# Patient Record
Sex: FEMALE | URBAN - METROPOLITAN AREA
[De-identification: names, ages, dates, MRNs, and addresses within clinical notes are randomized per-mention and may not be internally consistent; named-entity substitution may affect disease eponyms.]

---

## 2022-09-29 ENCOUNTER — WEB ENCOUNTER (OUTPATIENT)
Dept: URBAN - METROPOLITAN AREA CLINIC 107 | Facility: CLINIC | Age: 62
End: 2022-09-29

## 2022-10-04 ENCOUNTER — TELEPHONE ENCOUNTER (OUTPATIENT)
Dept: URBAN - METROPOLITAN AREA CLINIC 113 | Facility: CLINIC | Age: 62
End: 2022-10-04

## 2022-10-04 ENCOUNTER — OFFICE VISIT (OUTPATIENT)
Dept: URBAN - METROPOLITAN AREA CLINIC 107 | Facility: CLINIC | Age: 62
End: 2022-10-04
Payer: MEDICARE

## 2022-10-04 VITALS
SYSTOLIC BLOOD PRESSURE: 117 MMHG | HEART RATE: 70 BPM | HEIGHT: 63 IN | DIASTOLIC BLOOD PRESSURE: 88 MMHG | WEIGHT: 236.6 LBS | TEMPERATURE: 98.1 F | BODY MASS INDEX: 41.92 KG/M2

## 2022-10-04 DIAGNOSIS — R93.3 ABNORMAL CT SCAN, GASTROINTESTINAL TRACT: ICD-10-CM

## 2022-10-04 DIAGNOSIS — D17.1 LIPOMA OF ABDOMINAL WALL: ICD-10-CM

## 2022-10-04 PROCEDURE — 99203 OFFICE O/P NEW LOW 30 MIN: CPT | Performed by: STUDENT IN AN ORGANIZED HEALTH CARE EDUCATION/TRAINING PROGRAM

## 2022-10-04 RX ORDER — LOSARTAN POTASSIUM 25 MG/1
1 TABLET TABLET ORAL ONCE A DAY
Status: ACTIVE | COMMUNITY

## 2022-10-04 RX ORDER — LEVOTHYROXINE SODIUM 112 UG/1
1 TABLET IN THE MORNING ON AN EMPTY STOMACH TABLET ORAL ONCE A DAY
Status: ACTIVE | COMMUNITY

## 2022-10-04 RX ORDER — SODIUM SULFATE, MAGNESIUM SULFATE, AND POTASSIUM CHLORIDE 17.75; 2.7; 2.25 G/1; G/1; G/1
12 TABLETS TABLET ORAL
Qty: 24 TABLETS | Refills: 0 | OUTPATIENT
Start: 2022-10-04 | End: 2022-10-05
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
## 2022-10-04 NOTE — PHYSICAL EXAM GASTROINTESTINAL
Abdomen , soft, nontender, nondistended , no guarding or rigidity , lumpy/bumpy and mobile area felt within subcutaneous region of RUQ , normal bowel sounds , Liver and Spleen,  no hepatosplenomegaly , liver nontender

## 2022-10-26 ENCOUNTER — TELEPHONE ENCOUNTER (OUTPATIENT)
Dept: URBAN - METROPOLITAN AREA CLINIC 113 | Facility: CLINIC | Age: 62
End: 2022-10-26

## 2022-11-09 ENCOUNTER — OFFICE VISIT (OUTPATIENT)
Dept: URBAN - METROPOLITAN AREA MEDICAL CENTER 37 | Facility: MEDICAL CENTER | Age: 62
End: 2022-11-09

## 2022-12-05 ENCOUNTER — OFFICE VISIT (OUTPATIENT)
Dept: URBAN - METROPOLITAN AREA CLINIC 107 | Facility: CLINIC | Age: 62
End: 2022-12-05

## 2023-10-16 PROBLEM — Z00.00 ENCOUNTER FOR PREVENTIVE HEALTH EXAMINATION: Status: ACTIVE | Noted: 2023-10-16

## 2023-11-01 ENCOUNTER — DASHBOARD ENCOUNTERS (OUTPATIENT)
Age: 63
End: 2023-11-01

## 2023-11-01 ENCOUNTER — LAB OUTSIDE AN ENCOUNTER (OUTPATIENT)
Dept: URBAN - METROPOLITAN AREA CLINIC 72 | Facility: CLINIC | Age: 63
End: 2023-11-01

## 2023-11-01 ENCOUNTER — OFFICE VISIT (OUTPATIENT)
Dept: URBAN - METROPOLITAN AREA CLINIC 72 | Facility: CLINIC | Age: 63
End: 2023-11-01
Payer: MEDICARE

## 2023-11-01 VITALS
DIASTOLIC BLOOD PRESSURE: 81 MMHG | SYSTOLIC BLOOD PRESSURE: 120 MMHG | BODY MASS INDEX: 43.02 KG/M2 | WEIGHT: 242.8 LBS | HEIGHT: 63 IN | HEART RATE: 80 BPM | TEMPERATURE: 97.3 F

## 2023-11-01 DIAGNOSIS — R93.3 ABNORMAL CT SCAN, GASTROINTESTINAL TRACT: ICD-10-CM

## 2023-11-01 DIAGNOSIS — Z12.11 COLON CANCER SCREENING: ICD-10-CM

## 2023-11-01 PROCEDURE — 99213 OFFICE O/P EST LOW 20 MIN: CPT | Performed by: INTERNAL MEDICINE

## 2023-11-01 RX ORDER — LOSARTAN POTASSIUM 25 MG/1
1 TABLET TABLET ORAL ONCE A DAY
Status: ACTIVE | COMMUNITY

## 2023-11-01 RX ORDER — LEVOTHYROXINE SODIUM 112 UG/1
1 TABLET IN THE MORNING ON AN EMPTY STOMACH TABLET ORAL ONCE A DAY
Status: ACTIVE | COMMUNITY

## 2023-11-01 RX ORDER — SOD SULF/POT CHLORIDE/MAG SULF 1.479 G
12 TABLETS TABLET ORAL
Qty: 24 | Refills: 0 | OUTPATIENT
Start: 2023-11-01 | End: 2023-11-02
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
## 2023-11-01 NOTE — EXAM-GENERAL EXAMINATION
General--no acute distress, resting comfortably Eyes--anicteric, no pallor HENT--normocephalic, atraumatic head Neck--no lymphadenopathy, non tender Chest--non labored breathing, equal rise Abdomen--soft, non tender, non distended, no organomegaly Ext: ESTEVAN, no obvious sores or rashes Psych: appropriate mood and affect Neuro--alert and oriented, answers appropriately

## 2023-12-18 ENCOUNTER — APPOINTMENT (OUTPATIENT)
Dept: NEUROLOGY | Facility: CLINIC | Age: 63
End: 2023-12-18
Payer: MEDICARE

## 2023-12-18 VITALS
SYSTOLIC BLOOD PRESSURE: 114 MMHG | WEIGHT: 235 LBS | HEIGHT: 63 IN | DIASTOLIC BLOOD PRESSURE: 80 MMHG | OXYGEN SATURATION: 95 % | HEART RATE: 94 BPM | BODY MASS INDEX: 41.64 KG/M2

## 2023-12-18 DIAGNOSIS — E53.8 DEFICIENCY OF OTHER SPECIFIED B GROUP VITAMINS: ICD-10-CM

## 2023-12-18 DIAGNOSIS — R20.0 ANESTHESIA OF SKIN: ICD-10-CM

## 2023-12-18 DIAGNOSIS — R20.2 ANESTHESIA OF SKIN: ICD-10-CM

## 2023-12-18 DIAGNOSIS — Z86.39 PERSONAL HISTORY OF OTHER ENDOCRINE, NUTRITIONAL AND METABOLIC DISEASE: ICD-10-CM

## 2023-12-18 DIAGNOSIS — Z86.69 PERSONAL HISTORY OF OTHER DISEASES OF THE NERVOUS SYSTEM AND SENSE ORGANS: ICD-10-CM

## 2023-12-18 DIAGNOSIS — Z02.82 ENCOUNTER FOR ADOPTION SERVICES: ICD-10-CM

## 2023-12-18 DIAGNOSIS — Z86.79 PERSONAL HISTORY OF OTHER DISEASES OF THE CIRCULATORY SYSTEM: ICD-10-CM

## 2023-12-18 DIAGNOSIS — Z78.9 OTHER SPECIFIED HEALTH STATUS: ICD-10-CM

## 2023-12-18 PROCEDURE — 99204 OFFICE O/P NEW MOD 45 MIN: CPT

## 2023-12-18 RX ORDER — ASCORBIC ACID 125 MG
TABLET,CHEWABLE ORAL
Refills: 0 | Status: ACTIVE | COMMUNITY

## 2023-12-18 RX ORDER — CYANOCOBALAMIN, ISOPROPYL ALCOHOL 1000MCG/ML
KIT INJECTION
Refills: 0 | Status: ACTIVE | COMMUNITY

## 2023-12-18 RX ORDER — NAPROXEN SODIUM 220 MG
TABLET ORAL
Refills: 0 | Status: ACTIVE | COMMUNITY

## 2023-12-18 RX ORDER — DULOXETINE HYDROCHLORIDE 30 MG/1
30 CAPSULE, DELAYED RELEASE PELLETS ORAL
Qty: 60 | Refills: 3 | Status: ACTIVE | COMMUNITY
Start: 2023-12-18 | End: 1900-01-01

## 2023-12-18 RX ORDER — HYDROCHLOROTHIAZIDE 12.5 MG/1
12.5 CAPSULE ORAL
Refills: 0 | Status: ACTIVE | COMMUNITY

## 2023-12-18 RX ORDER — LOSARTAN POTASSIUM 25 MG/1
25 TABLET, FILM COATED ORAL
Refills: 0 | Status: ACTIVE | COMMUNITY

## 2023-12-18 RX ORDER — LEVOTHYROXINE SODIUM 0.11 MG/1
112 TABLET ORAL
Refills: 0 | Status: ACTIVE | COMMUNITY

## 2023-12-18 NOTE — REVIEW OF SYSTEMS
[Leg Weakness] : leg weakness [Numbness] : numbness [Tingling] : tingling [Abnormal Sensation] : an abnormal sensation [Depression] : depression [Arthralgias] : arthralgias [Joint Pain] : joint pain [Negative] : Heme/Lymph [Fever] : no fever [Chills] : no chills [Feeling Tired] : not feeling tired [Confused or Disoriented] : no confusion [Memory Lapses or Loss] : no memory loss [Decr. Concentrating Ability] : no decrease in concentrating ability [Difficulty with Language] : no ~M difficulty with language [Facial Weakness] : no facial weakness [Arm Weakness] : no arm weakness [Hand Weakness] : no hand weakness [Seizures] : no convulsions [Dizziness] : no dizziness [Fainting] : no fainting [Lightheadedness] : no lightheadedness [Vertigo] : no vertigo [Migraine Headache] : no migraine headache [Difficulty Walking] : no difficulty walking [Suicidal] : not suicidal [Sleep Disturbances] : no sleep disturbances [Anxiety] : no anxiety [Eye Pain] : no eye pain [Red Eyes] : eyes not red [Earache] : no earache [Loss Of Hearing] : no hearing loss [Shortness Of Breath] : no shortness of breath [SOB on Exertion] : no shortness of breath during exertion [Abdominal Pain] : no abdominal pain [Vomiting] : no vomiting [Constipation] : no constipation [Diarrhea] : no diarrhea

## 2023-12-18 NOTE — DISCUSSION/SUMMARY
[FreeTextEntry1] : 63-year-old female presents to clinic with 3-year history of numbness tingling in the bilateral upper and lower extremities with weakness in the right lower extremity.  MRIs demonstrate significant degenerative changes at multiple levels.  This is concerning for for radiculopathy and weakness.  My plan is as follows:  1 request EMG/NCS bilateral upper and lower extremities  2. I have requested the following blood work-up for neuropathy; ANCA Reflex MPO and PROT3, Angiotensin Converting Enzyme, Serum, Anti DNAse B Titer, Anti-Nuclear Antibody Ceruloplasmin, serum, C-Reactive Protein, Serum, Heavy Metal Screen, Hepatitis C Ab w/reflex to qualitative HCVRNA, Homocysteine, Serum, Protein Electrophoresis, Serum Protein Electrophoresis, Urine, Rheumatoid Factor Quant, Serum or Plasma, Sedimentation Rate, Erythrocyte, Thyroid Stimulating Hormone, Serum w/ FT4 Reflex, Vitamin B1, Whole Blood, Vitamin B12, Serum and, Vitamin B6.  3.  Referral to physical therapy for degenerative changes with cervical thoracic and lumbar spines with related numbness tingling and weakness.  4.  Start patient on Cymbalta 30 mg every 24 hours for paresthesia of the upper and lower extremities and for possible depression related to chronic pain.  In 10 days, patient can increase to 60 mg every 24 hours if not palliative and tolerating side effects.  5.  Return to clinic in 6 weeks for follow-up evaluation and this can be via telehealth as well.

## 2023-12-18 NOTE — PHYSICAL EXAM
[General Appearance - Alert] : alert [General Appearance - In No Acute Distress] : in no acute distress [Oriented To Time, Place, And Person] : oriented to person, place, and time [Affect] : the affect was normal [Mood] : the mood was normal [Cranial Nerves Optic (II)] : visual acuity intact bilaterally,  visual fields full to confrontation, pupils equal round and reactive to light [Cranial Nerves Oculomotor (III)] : extraocular motion intact [Cranial Nerves Trigeminal (V)] : facial sensation intact symmetrically [Cranial Nerves Facial (VII)] : face symmetrical [Cranial Nerves Vestibulocochlear (VIII)] : hearing was intact bilaterally [Cranial Nerves Glossopharyngeal (IX)] : tongue and palate midline [Cranial Nerves Accessory (XI - Cranial And Spinal)] : head turning and shoulder shrug symmetric [Cranial Nerves Hypoglossal (XII)] : there was no tongue deviation with protrusion [Motor Strength] : muscle strength was normal in all four extremities [Involuntary Movements] : no involuntary movements were seen [No Muscle Atrophy] : normal bulk in all four extremities [Motor Handedness Right-Handed] : the patient is right hand dominant [Sensation Vibration Decrease] : vibration was intact [Sensation Tactile Decrease] : light touch was intact [Proprioception] : proprioception was intact [1+] : Ankle jerk left 1+ [PERRL With Normal Accommodation] : pupils were equal in size, round, reactive to light, with normal accommodation [Extraocular Movements] : extraocular movements were intact [Outer Ear] : the ears and nose were normal in appearance [Hearing Threshold Finger Rub Not Cobb] : hearing was normal [Neck Appearance] : the appearance of the neck was normal [Neck Cervical Mass (___cm)] : no neck mass was observed [Exaggerated Use Of Accessory Muscles For Inspiration] : no accessory muscle use [Auscultation Breath Sounds / Voice Sounds] : lungs were clear to auscultation bilaterally [Apical Impulse] : the apical impulse was normal [Heart Sounds] : normal S1 and S2 [Murmurs] : no murmurs [Arterial Pulses Carotid] : carotid pulses were normal with no bruits [Abdomen Soft] : soft [Abdomen Tenderness] : non-tender [Nail Clubbing] : no clubbing  or cyanosis of the fingernails [Motor Tone] : muscle strength and tone were normal [Apractic] : apractic [Ataxic] : ataxic [] : no rash [Skin Lesions] : no skin lesions [FreeTextEntry1] : BMI 41.63 [Paresis Pronator Drift Right-Sided] : no pronator drift on the right [Paresis Pronator Drift Left-Sided] : no pronator drift on the left [Motor Strength Upper Extremities Bilaterally] : strength was normal in both upper extremities [Motor Strength Lower Extremities Bilaterally] : strength was normal in both lower extremities [Romberg's Sign] : Romberg's sign was negtive [Tremor] : no tremor present [Coordination - Dysmetria Impaired Finger-to-Nose Bilateral] : not present

## 2023-12-18 NOTE — DATA REVIEWED
[de-identified] :   MRI brain with IV contrast 12/11/2023: Transsphenoidal postsurgical changes of the sella with draping of the optic chiasm and partially empty sella.  Unchanged enhancing soft tissue along the left cavernous sinus may represent residual neoplasm.  The normal flow voids of the internal carotid arteries are present.  No mass effect upon the optic nerves or optic chiasm is noted.  No acute hemorrhage or infarct.  Age-appropriate able involutional and mild microvascular ischemic change.  A left parietal developmental venous anomaly with associated chronic hemorrhage, unchanged compared with the prior study.  No hydrocephalus midline shift or extra-axial collections.  Flow voids at the skull base are unremarkable.  There is no extra-axial collection or extra-axial mass.  The orbits are not remarkable in appearance.  The visualized paranasal sinuses mastoid cavities are free of acute disease.  Multiple sebaceous cysts are noted within the scalp.  Impression unchanged appearance of the sella with small amount of enhancing soft tissue adjacent to/within the left cavernous sinus likely representing residual tumor.  Age-appropriate involutional and mild microvascular ischemic change.  Left parietal the omental venous anomaly with adjacent chronic hemorrhage.  MRI LS spine noncontrast 3/24/2021: 5 lumbar vertebral bodies are assumed.  Coronal localizer images demonstrate no significant scoliosis.  The normal lumbar lordosis is straightened.  Mild grade 1 anterior listhesis of L4 and L5 is noted probable hemangioma is noted within the T12 vertebral body, left L1 pedicle, L3 vertebral body and S1 vertebral body.  No aggressive appearing osseous lesion is appreciated.  The vertebral body heights are intact.  The conus terminates at T2-L1.  There is multilevel discogenic degenerative disease with disc desiccation and intervertebral disc height narrowing.  The findings at the individual levels are as follows: T12-L1 only evaluated in the sagittal plane.  Circumferential disc bulge indents the ventral thecal sac.  L1-2 there is circumferential disc bulge symmetric to the right resulting in flattening of the vertebral thecal sac and minimal right neuroforaminal narrowing.  L2-3 broad-based proximal left foraminal disc protrusion resulting in left neuroforaminal narrowing and contact of the existing left L2 nerve root.  L3-4 there is circumferential disc bulge and a superimposed central disc protrusion and tiny annular tear.  The ventral thecal sac is indented.  In conjunction with ligamentum flavum unfolding there is mild central canal narrowing.  Mild bilateral neuroforaminal stenosis is also noted left greater than right.  L4-5 severe bilateral facet arthrosis is noted right greater than left.  Uncovering of the disc and a circumferential disc bulge contribute to moderate right greater than left neuroforaminal stenosis and exiting L4 nerve root impingement.  The ventral thecal sac is flattened.  L5-S1 there is circumferential disc bulge with superimposed central/right paracentral disc protrusion with indenting of the ventral thecal sac.  Discogenic contacts but does not displace the descending right S1 nerve root.  Mild bilateral neuroforaminal narrowing is noted.  There is mild bilateral facet arthrosis.  The posterior Spinal muscles are symmetric.  Impression straightening with mild L4-5 spondylolisthesis.  Spondylosis and facet arthrosis most notably at L4-5 with exiting L4 nerve root impingement.  Additional milder filings at the levels as above.  MRI thoracic spine noncontrast 3/24/2021: 12 thoracic vertebral bodies are assumed.  Coronal localizer images demonstrate no significant scoliosis.  The thoracic spine is mildly kyphotic mild grade 1 anterior listhesis of T2 on T3 is noted.  Probable atypical vertebral body hemangioma suggested within the posterior aspect of T12 vertebral body.  No aggressive appearing osseous lesion is present.  The vertebral body heights are intact.  There is mild disc desiccation and intervertebral disc height narrowing.  A shallow broad-based central disc protrusion at T1-2 and mildly flattening the ventral thecal sac.  There is a broad-based right paracentral disc protrusion with mild indentation of the ventral thecal sac and flattening of the ventral cord.  No cord edema is present no severe central canal or neuroforaminal stenosis is appreciated.  The thoracic cord demonstrates normal signal intensity.  There is no evidence for cord edema.  The conus medullaris is at T12-L1.  The posterior paraspinal muscles are symmetric.  Impression mild upper thoracic spondylosis with no cord edema.  MRI cervical spine noncontrast 11/24/2020.  Posterior fossa and brainstem unremarkable.  The cerebellar tonsils terminate in the normal position without a Chiari malformation.  Normal spinal cord signal characteristics without edema or myelomalacia from the cervical medullary junction to the visualized upper thoracic cord.  Vertebral bodies are normal in height without fractures.  No vertebral bodies or thesis is present.  Normal bone marrow signal.  No focal osseous lesion is seen.  Paraspinal soft tissues are unremarkable.  The thyroid is unremarkable.  C2-3 there is bilateral neuroforaminal narrowing.  There is no spinal canal stenosis.  There is no disc herniation.  C3-4 there is minimal broad-based posterior disc osteophyte complex.  There is left neuroforaminal narrowing.  There is no spinal canal stenosis.  C4-5 there is a minimal broad-based posterior disc osteophyte complex.  There is bilateral neuroforaminal narrowing.  There is no spinal canal stenosis.  C5-6 there is a 3 mm broad-based posterior disc osteophyte complex there is spinal canal stenosis measuring 8 mm in the AP dimension there is bilateral neuroforaminal narrowing.  C6-7 there is a 3 mm broad-based posterior disc osteophyte complex.  There is no neuroforaminal narrowing.  There is no spinal canal stenosis.  C7-T1 no disc herniation, central canal or foraminal stenosis.
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
## 2023-12-20 ENCOUNTER — OFFICE (OUTPATIENT)
Dept: URBAN - METROPOLITAN AREA CLINIC 12 | Facility: CLINIC | Age: 63
Setting detail: OPHTHALMOLOGY
End: 2023-12-20
Payer: MEDICARE

## 2023-12-20 ENCOUNTER — TELEPHONE ENCOUNTER (OUTPATIENT)
Dept: URBAN - METROPOLITAN AREA CLINIC 113 | Facility: CLINIC | Age: 63
End: 2023-12-20

## 2023-12-20 DIAGNOSIS — H25.13: ICD-10-CM

## 2023-12-20 DIAGNOSIS — D35.2: ICD-10-CM
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
## 2023-12-20 DIAGNOSIS — H35.3131: ICD-10-CM

## 2023-12-20 PROCEDURE — 92083 EXTENDED VISUAL FIELD XM: CPT | Performed by: OPHTHALMOLOGY

## 2023-12-20 PROCEDURE — BRUDER EYE BRUDER EYE PADS: Performed by: OPHTHALMOLOGY

## 2023-12-20 PROCEDURE — 92250 FUNDUS PHOTOGRAPHY W/I&R: CPT | Performed by: OPHTHALMOLOGY

## 2023-12-20 PROCEDURE — 99204 OFFICE O/P NEW MOD 45 MIN: CPT | Performed by: OPHTHALMOLOGY

## 2023-12-20 ASSESSMENT — REFRACTION_AUTOREFRACTION
OS_CYLINDER: -1.00
OD_AXIS: 114
OD_SPHERE: -2.25
OD_CYLINDER: -0.75
OS_AXIS: 123
OS_SPHERE: -4.25

## 2023-12-20 ASSESSMENT — REFRACTION_MANIFEST
OS_SPHERE: -4.25
OD_CYLINDER: -0.75
OD_VA1: 20/40-1
OS_VA1: 20/50-2
OD_SPHERE: -2.25
OS_AXIS: 125
OS_CYLINDER: -1.00
OD_AXIS: 115

## 2023-12-20 ASSESSMENT — SPHEQUIV_DERIVED
OD_SPHEQUIV: -2.625
OS_SPHEQUIV: -4.75
OS_SPHEQUIV: -4.75
OD_SPHEQUIV: -2.625

## 2023-12-20 ASSESSMENT — CONFRONTATIONAL VISUAL FIELD TEST (CVF)
OD_FINDINGS: FULL
OS_FINDINGS: FULL

## 2023-12-21 ENCOUNTER — OFFICE (OUTPATIENT)
Dept: URBAN - METROPOLITAN AREA CLINIC 12 | Facility: CLINIC | Age: 63
Setting detail: OPHTHALMOLOGY
End: 2023-12-21
Payer: MEDICARE

## 2023-12-21 DIAGNOSIS — H25.13: ICD-10-CM

## 2023-12-21 DIAGNOSIS — H25.11: ICD-10-CM

## 2023-12-21 PROBLEM — H25.12 CATARACT; RIGHT EYE, LEFT EYE, BOTH EYES: Status: ACTIVE | Noted: 2023-12-21

## 2023-12-21 PROBLEM — H35.3131 AGE RELATED MACULAR DEGENERATION DRY; BOTH EYES EARLY: Status: ACTIVE | Noted: 2023-12-20

## 2023-12-21 PROBLEM — D35.2 PITUITARY TUMOR BENIGN: Status: ACTIVE | Noted: 2023-12-20

## 2023-12-21 PROCEDURE — 99213 OFFICE O/P EST LOW 20 MIN: CPT | Performed by: OPHTHALMOLOGY

## 2023-12-21 PROCEDURE — 92136 OPHTHALMIC BIOMETRY: CPT | Mod: TC | Performed by: OPHTHALMOLOGY

## 2023-12-21 PROCEDURE — 92136 OPHTHALMIC BIOMETRY: CPT | Mod: 26,RT | Performed by: OPHTHALMOLOGY

## 2023-12-21 ASSESSMENT — REFRACTION_MANIFEST
OD_CYLINDER: -0.75
OS_CYLINDER: -1.00
OD_VA1: 20/40-1
OD_AXIS: 115
OD_SPHERE: -2.25
OS_VA1: 20/50-2
OS_AXIS: 125
OS_SPHERE: -4.25

## 2023-12-21 ASSESSMENT — REFRACTION_AUTOREFRACTION
OD_CYLINDER: -0.75
OS_CYLINDER: -0.75
OD_SPHERE: -2.75
OS_SPHERE: -5.25
OD_AXIS: 108
OS_AXIS: 118

## 2023-12-21 ASSESSMENT — SPHEQUIV_DERIVED
OD_SPHEQUIV: -2.625
OS_SPHEQUIV: -4.75
OS_SPHEQUIV: -5.625
OD_SPHEQUIV: -3.125

## 2023-12-21 ASSESSMENT — CONFRONTATIONAL VISUAL FIELD TEST (CVF)
OD_FINDINGS: FULL
OS_FINDINGS: FULL

## 2024-01-02 ENCOUNTER — ASC (OUTPATIENT)
Dept: URBAN - METROPOLITAN AREA SURGERY 8 | Facility: SURGERY | Age: 64
Setting detail: OPHTHALMOLOGY
End: 2024-01-02
Payer: MEDICARE

## 2024-01-02 DIAGNOSIS — H25.11: ICD-10-CM

## 2024-01-02 DIAGNOSIS — H52.211: ICD-10-CM

## 2024-01-02 PROCEDURE — A9270 NON-COVERED ITEM OR SERVICE: HCPCS | Mod: GY | Performed by: OPHTHALMOLOGY

## 2024-01-02 PROCEDURE — 66984 XCAPSL CTRC RMVL W/O ECP: CPT | Mod: RT | Performed by: OPHTHALMOLOGY

## 2024-01-02 PROCEDURE — FEMTO PRECISION LASER CATARACT SURGERY: Mod: GY | Performed by: OPHTHALMOLOGY

## 2024-01-03 ENCOUNTER — RX ONLY (RX ONLY)
Age: 64
End: 2024-01-03

## 2024-01-03 ENCOUNTER — OFFICE (OUTPATIENT)
Dept: URBAN - METROPOLITAN AREA CLINIC 12 | Facility: CLINIC | Age: 64
Setting detail: OPHTHALMOLOGY
End: 2024-01-03
Payer: MEDICARE

## 2024-01-03 DIAGNOSIS — H25.12: ICD-10-CM

## 2024-01-03 PROCEDURE — 92136 OPHTHALMIC BIOMETRY: CPT | Mod: 26,LT | Performed by: OPHTHALMOLOGY

## 2024-01-03 ASSESSMENT — REFRACTION_AUTOREFRACTION
OD_CYLINDER: -0.25
OS_CYLINDER: -1.25
OD_AXIS: 167
OS_AXIS: 135
OD_SPHERE: -0.50
OS_SPHERE: -4.75

## 2024-01-03 ASSESSMENT — REFRACTION_MANIFEST
OS_CYLINDER: -1.00
OD_AXIS: 115
OD_CYLINDER: -0.75
OD_VA1: 20/40-1
OD_SPHERE: -2.25
OS_VA1: 20/50-2
OS_SPHERE: -4.25
OS_AXIS: 125

## 2024-01-03 ASSESSMENT — SPHEQUIV_DERIVED
OD_SPHEQUIV: -2.625
OD_SPHEQUIV: -0.625
OS_SPHEQUIV: -5.375
OS_SPHEQUIV: -4.75

## 2024-01-03 ASSESSMENT — CONFRONTATIONAL VISUAL FIELD TEST (CVF)
OS_FINDINGS: FULL
OD_FINDINGS: FULL

## 2024-01-09 ENCOUNTER — ASC (OUTPATIENT)
Dept: URBAN - METROPOLITAN AREA SURGERY 8 | Facility: SURGERY | Age: 64
Setting detail: OPHTHALMOLOGY
End: 2024-01-09
Payer: MEDICARE

## 2024-01-09 DIAGNOSIS — H52.212: ICD-10-CM

## 2024-01-09 DIAGNOSIS — H25.12: ICD-10-CM

## 2024-01-09 PROCEDURE — 66984 XCAPSL CTRC RMVL W/O ECP: CPT | Mod: 79,LT | Performed by: OPHTHALMOLOGY

## 2024-01-09 PROCEDURE — FEMTO PRECISION LASER CATARACT SURGERY: Mod: GY | Performed by: OPHTHALMOLOGY

## 2024-01-09 PROCEDURE — A9270 NON-COVERED ITEM OR SERVICE: HCPCS | Mod: GY | Performed by: OPHTHALMOLOGY

## 2024-01-10 ENCOUNTER — OFFICE (OUTPATIENT)
Dept: URBAN - METROPOLITAN AREA CLINIC 12 | Facility: CLINIC | Age: 64
Setting detail: OPHTHALMOLOGY
End: 2024-01-10
Payer: MEDICARE

## 2024-01-10 DIAGNOSIS — Z96.1: ICD-10-CM

## 2024-01-10 PROCEDURE — 99024 POSTOP FOLLOW-UP VISIT: CPT | Performed by: OPTOMETRIST

## 2024-01-10 ASSESSMENT — REFRACTION_AUTOREFRACTION
OS_AXIS: 012
OS_CYLINDER: -0.25
OD_SPHERE: PLANO
OS_SPHERE: +0.75
OD_CYLINDER: -0.25
OD_AXIS: 113

## 2024-01-10 ASSESSMENT — REFRACTION_MANIFEST
OD_CYLINDER: -0.75
OS_SPHERE: -4.25
OS_CYLINDER: -1.00
OS_AXIS: 125
OS_VA1: 20/50-2
OD_SPHERE: -2.25
OD_AXIS: 115
OD_VA1: 20/40-1

## 2024-01-10 ASSESSMENT — SPHEQUIV_DERIVED
OD_SPHEQUIV: -2.625
OS_SPHEQUIV: -4.75
OS_SPHEQUIV: 0.625

## 2024-01-10 ASSESSMENT — CONFRONTATIONAL VISUAL FIELD TEST (CVF)
OS_FINDINGS: FULL
OD_FINDINGS: FULL

## 2024-01-11 ENCOUNTER — OFFICE (OUTPATIENT)
Dept: URBAN - METROPOLITAN AREA CLINIC 12 | Facility: CLINIC | Age: 64
Setting detail: OPHTHALMOLOGY
End: 2024-01-11
Payer: MEDICARE

## 2024-01-11 DIAGNOSIS — Z96.1: ICD-10-CM

## 2024-01-11 PROCEDURE — 99024 POSTOP FOLLOW-UP VISIT: CPT | Performed by: OPTOMETRIST

## 2024-01-11 ASSESSMENT — REFRACTION_MANIFEST
OS_AXIS: 125
OD_VA1: 20/40-1
OS_SPHERE: -4.25
OD_CYLINDER: -0.75
OS_CYLINDER: -1.00
OD_SPHERE: -2.25
OS_VA1: 20/50-2
OD_AXIS: 115

## 2024-01-11 ASSESSMENT — SPHEQUIV_DERIVED
OS_SPHEQUIV: -4.75
OD_SPHEQUIV: -2.625
OS_SPHEQUIV: 0.25

## 2024-01-11 ASSESSMENT — REFRACTION_AUTOREFRACTION
OS_CYLINDER: -1.00
OD_CYLINDER: -0.50
OD_AXIS: 138
OD_SPHERE: PLANO
OS_AXIS: 173
OS_SPHERE: +0.75

## 2024-01-11 ASSESSMENT — CONFRONTATIONAL VISUAL FIELD TEST (CVF)
OS_FINDINGS: FULL
OD_FINDINGS: FULL

## 2024-01-15 ENCOUNTER — OFFICE (OUTPATIENT)
Dept: URBAN - METROPOLITAN AREA CLINIC 12 | Facility: CLINIC | Age: 64
Setting detail: OPHTHALMOLOGY
End: 2024-01-15
Payer: MEDICARE

## 2024-01-15 DIAGNOSIS — Z96.1: ICD-10-CM

## 2024-01-15 PROCEDURE — 99024 POSTOP FOLLOW-UP VISIT: CPT | Performed by: OPTOMETRIST

## 2024-01-15 ASSESSMENT — CONFRONTATIONAL VISUAL FIELD TEST (CVF)
OD_FINDINGS: FULL
OS_FINDINGS: FULL

## 2024-01-16 ASSESSMENT — SPHEQUIV_DERIVED
OS_SPHEQUIV: 0.125
OD_SPHEQUIV: -2.625
OS_SPHEQUIV: -4.75
OD_SPHEQUIV: 0

## 2024-01-16 ASSESSMENT — REFRACTION_AUTOREFRACTION
OS_SPHERE: +0.50
OD_CYLINDER: -0.50
OS_CYLINDER: -0.75
OD_SPHERE: +0.25
OD_AXIS: 133
OS_AXIS: 172

## 2024-01-16 ASSESSMENT — REFRACTION_MANIFEST
OS_SPHERE: -4.25
OD_SPHERE: -2.25
OD_AXIS: 115
OS_AXIS: 125
OS_VA1: 20/50-2
OD_VA1: 20/40-1
OD_CYLINDER: -0.75
OS_CYLINDER: -1.00

## 2024-01-25 ENCOUNTER — OFFICE VISIT (OUTPATIENT)
Dept: URBAN - METROPOLITAN AREA MEDICAL CENTER 40 | Facility: MEDICAL CENTER | Age: 64
End: 2024-01-25

## 2024-01-29 ENCOUNTER — APPOINTMENT (OUTPATIENT)
Dept: NEUROLOGY | Facility: CLINIC | Age: 64
End: 2024-01-29

## 2024-01-31 ENCOUNTER — OFFICE (OUTPATIENT)
Dept: URBAN - METROPOLITAN AREA CLINIC 12 | Facility: CLINIC | Age: 64
Setting detail: OPHTHALMOLOGY
End: 2024-01-31
Payer: MEDICARE

## 2024-01-31 DIAGNOSIS — Z96.1: ICD-10-CM

## 2024-01-31 PROBLEM — H52.7 REFRACTIVE ERROR ; BOTH EYES: Status: ACTIVE | Noted: 2024-01-31

## 2024-01-31 PROCEDURE — 99024 POSTOP FOLLOW-UP VISIT: CPT | Performed by: OPTOMETRIST

## 2024-01-31 ASSESSMENT — REFRACTION_MANIFEST
OD_ADD: +2.25
OS_VA1: 20/20
OS_CYLINDER: -0.25
OD_VA1: 20/20
OS_ADD: +2.25
OS_SPHERE: -4.25
OD_AXIS: 115
OS_VA1: 20/50-2
OD_CYLINDER: SPHERE
OS_SPHERE: +0.25
OS_AXIS: 125
OD_SPHERE: -2.25
OD_SPHERE: PLANO
OS_CYLINDER: -1.00
OD_VA1: 20/40-1
OD_CYLINDER: -0.75
OS_AXIS: 150

## 2024-01-31 ASSESSMENT — CONFRONTATIONAL VISUAL FIELD TEST (CVF)
OS_FINDINGS: FULL
OD_FINDINGS: FULL

## 2024-01-31 ASSESSMENT — SPHEQUIV_DERIVED
OD_SPHEQUIV: -2.625
OS_SPHEQUIV: -4.75
OS_SPHEQUIV: 0.125

## 2024-02-08 ENCOUNTER — OV EP (OUTPATIENT)
Dept: URBAN - METROPOLITAN AREA CLINIC 72 | Facility: CLINIC | Age: 64
End: 2024-02-08

## 2024-03-22 ENCOUNTER — COLON (OUTPATIENT)
Dept: URBAN - METROPOLITAN AREA MEDICAL CENTER 40 | Facility: MEDICAL CENTER | Age: 64
End: 2024-03-22

## 2024-03-22 RX ORDER — LEVOTHYROXINE SODIUM 112 UG/1
1 TABLET IN THE MORNING ON AN EMPTY STOMACH TABLET ORAL ONCE A DAY
Status: ACTIVE | COMMUNITY

## 2024-03-22 RX ORDER — LOSARTAN POTASSIUM 25 MG/1
1 TABLET TABLET ORAL ONCE A DAY
Status: ACTIVE | COMMUNITY

## 2024-12-30 ENCOUNTER — OFFICE (OUTPATIENT)
Dept: URBAN - METROPOLITAN AREA CLINIC 12 | Facility: CLINIC | Age: 64
Setting detail: OPHTHALMOLOGY
End: 2024-12-30
Payer: MEDICARE

## 2024-12-30 DIAGNOSIS — D35.2: ICD-10-CM

## 2024-12-30 DIAGNOSIS — H26.493: ICD-10-CM

## 2024-12-30 DIAGNOSIS — Z96.1: ICD-10-CM

## 2024-12-30 DIAGNOSIS — H52.7: ICD-10-CM

## 2024-12-30 DIAGNOSIS — H35.3131: ICD-10-CM

## 2024-12-30 PROCEDURE — 92250 FUNDUS PHOTOGRAPHY W/I&R: CPT | Performed by: STUDENT IN AN ORGANIZED HEALTH CARE EDUCATION/TRAINING PROGRAM

## 2024-12-30 PROCEDURE — 92014 COMPRE OPH EXAM EST PT 1/>: CPT | Performed by: STUDENT IN AN ORGANIZED HEALTH CARE EDUCATION/TRAINING PROGRAM

## 2024-12-30 ASSESSMENT — REFRACTION_MANIFEST
OD_ADD: +2.25
OS_CYLINDER: -0.25
OD_VA1: 20/40-1
OS_AXIS: 150
OS_VA1: 20/50-2
OS_ADD: +2.25
OS_CYLINDER: -1.00
OD_SPHERE: -2.25
OS_VA1: 20/20
OS_SPHERE: -4.25
OD_CYLINDER: -0.75
OD_SPHERE: PLANO
OS_AXIS: 125
OS_SPHERE: +0.25
OD_CYLINDER: SPHERE
OD_VA1: 20/20
OD_AXIS: 115

## 2024-12-30 ASSESSMENT — VISUAL ACUITY
OS_BCVA: 20/30
OD_BCVA: 20/30

## 2024-12-30 ASSESSMENT — REFRACTION_AUTOREFRACTION
OD_CYLINDER: SPHERE
OS_CYLINDER: -0.25
OS_AXIS: 018
OD_SPHERE: PLANO
OD_AXIS: 000
OS_SPHERE: +0.50

## 2024-12-30 ASSESSMENT — KERATOMETRY
OD_K1POWER_DIOPTERS: 43.75
OS_AXISANGLE_DEGREES: 110
OD_AXISANGLE_DEGREES: 089
OS_K1POWER_DIOPTERS: 43.00
OS_K2POWER_DIOPTERS: 44.25
OD_K2POWER_DIOPTERS: 44.25

## 2024-12-30 ASSESSMENT — CONFRONTATIONAL VISUAL FIELD TEST (CVF)
OS_FINDINGS: FULL
OD_FINDINGS: FULL

## 2024-12-30 ASSESSMENT — TONOMETRY
OD_IOP_MMHG: 19
OS_IOP_MMHG: 19

## 2025-05-01 ENCOUNTER — RX ONLY (RX ONLY)
Age: 65
End: 2025-05-01

## 2025-05-01 ENCOUNTER — OFFICE (OUTPATIENT)
Dept: URBAN - METROPOLITAN AREA CLINIC 12 | Facility: CLINIC | Age: 65
Setting detail: OPHTHALMOLOGY
End: 2025-05-01
Payer: MEDICARE

## 2025-05-01 DIAGNOSIS — H26.492: ICD-10-CM

## 2025-05-01 PROCEDURE — 66821 AFTER CATARACT LASER SURGERY: CPT | Mod: LT | Performed by: STUDENT IN AN ORGANIZED HEALTH CARE EDUCATION/TRAINING PROGRAM

## 2025-05-01 ASSESSMENT — TONOMETRY
OD_IOP_MMHG: 17
OS_IOP_MMHG: 21

## 2025-05-01 ASSESSMENT — REFRACTION_MANIFEST
OD_CYLINDER: SPHERE
OS_VA1: 20/20
OD_VA1: 20/40-1
OS_AXIS: 150
OD_SPHERE: PLANO
OD_ADD: +2.25
OS_SPHERE: -4.25
OD_CYLINDER: -0.75
OS_SPHERE: +0.25
OD_SPHERE: -2.25
OS_AXIS: 125
OS_CYLINDER: -1.00
OD_VA1: 20/20
OS_ADD: +2.25
OS_CYLINDER: -0.25
OS_VA1: 20/50-2
OD_AXIS: 115

## 2025-05-01 ASSESSMENT — KERATOMETRY
METHOD_AUTO_MANUAL: AUTO
OD_K2POWER_DIOPTERS: 44.00
OS_AXISANGLE_DEGREES: 081
OD_K1POWER_DIOPTERS: 43.75
OS_K1POWER_DIOPTERS: 43.25
OS_K2POWER_DIOPTERS: 44.50
OD_AXISANGLE_DEGREES: 168

## 2025-05-01 ASSESSMENT — VISUAL ACUITY
OD_BCVA: 20/25-1
OS_BCVA: 20/20

## 2025-05-01 ASSESSMENT — CONFRONTATIONAL VISUAL FIELD TEST (CVF)
OS_FINDINGS: FULL
OD_FINDINGS: FULL

## 2025-05-01 ASSESSMENT — REFRACTION_AUTOREFRACTION
OS_SPHERE: +0.50
OS_AXIS: 168
OD_CYLINDER: -0.25
OD_AXIS: 102
OD_SPHERE: +0.25
OS_CYLINDER: -0.75

## 2025-08-21 ENCOUNTER — RX ONLY (RX ONLY)
Age: 65
End: 2025-08-21

## 2025-08-21 ENCOUNTER — OFFICE (OUTPATIENT)
Dept: URBAN - METROPOLITAN AREA CLINIC 12 | Facility: CLINIC | Age: 65
Setting detail: OPHTHALMOLOGY
End: 2025-08-21
Payer: MEDICARE

## 2025-08-21 DIAGNOSIS — D35.2: ICD-10-CM

## 2025-08-21 DIAGNOSIS — H26.491: ICD-10-CM

## 2025-08-21 PROCEDURE — 66821 AFTER CATARACT LASER SURGERY: CPT | Mod: RT | Performed by: STUDENT IN AN ORGANIZED HEALTH CARE EDUCATION/TRAINING PROGRAM

## 2025-08-21 PROCEDURE — 92081 LIMITED VISUAL FIELD XM: CPT | Performed by: STUDENT IN AN ORGANIZED HEALTH CARE EDUCATION/TRAINING PROGRAM

## 2025-08-21 ASSESSMENT — TONOMETRY
OS_IOP_MMHG: 14
OD_IOP_MMHG: 12

## 2025-08-21 ASSESSMENT — REFRACTION_AUTOREFRACTION
OS_SPHERE: +0.75
OD_SPHERE: +0.50
OS_CYLINDER: -0.25
OD_AXIS: 115
OS_AXIS: 087
OD_CYLINDER: -0.50

## 2025-08-21 ASSESSMENT — REFRACTION_MANIFEST
OD_CYLINDER: SPH
OS_ADD: +2.25
OS_SPHERE: +0.25
OD_VA1: 20/20
OS_CYLINDER: -0.25
OD_VA1: 20/20
OS_VA1: 20/20
OS_AXIS: 150
OD_CYLINDER: SPHERE
OD_ADD: +2.25
OD_SPHERE: PLANO
OD_SPHERE: +0.75

## 2025-08-21 ASSESSMENT — VISUAL ACUITY
OD_BCVA: 20/25
OS_BCVA: 20/25-1

## 2025-08-21 ASSESSMENT — KERATOMETRY
OD_K1POWER_DIOPTERS: 43.50
OS_K2POWER_DIOPTERS: 43.50
OS_K1POWER_DIOPTERS: 43.00
METHOD_AUTO_MANUAL: AUTO
OD_K2POWER_DIOPTERS: 44.00
OS_AXISANGLE_DEGREES: 097
OD_AXISANGLE_DEGREES: 094

## 2025-08-21 ASSESSMENT — CONFRONTATIONAL VISUAL FIELD TEST (CVF)
OD_FINDINGS: FULL
OS_FINDINGS: FULL